# Patient Record
Sex: FEMALE | Race: BLACK OR AFRICAN AMERICAN | Employment: UNEMPLOYED | URBAN - METROPOLITAN AREA
[De-identification: names, ages, dates, MRNs, and addresses within clinical notes are randomized per-mention and may not be internally consistent; named-entity substitution may affect disease eponyms.]

---

## 2017-08-07 ENCOUNTER — HOSPITAL ENCOUNTER (EMERGENCY)
Age: 32
Discharge: HOME | End: 2017-08-07
Attending: EMERGENCY MEDICINE
Payer: SELF-PAY

## 2017-08-07 VITALS
DIASTOLIC BLOOD PRESSURE: 95 MMHG | OXYGEN SATURATION: 100 % | HEIGHT: 65 IN | WEIGHT: 216 LBS | SYSTOLIC BLOOD PRESSURE: 142 MMHG | HEART RATE: 83 BPM | RESPIRATION RATE: 20 BRPM | TEMPERATURE: 98.4 F | BODY MASS INDEX: 35.99 KG/M2

## 2017-08-07 DIAGNOSIS — K02.9 DENTAL CARIES: Primary | ICD-10-CM

## 2017-08-07 DIAGNOSIS — S02.5XXB OPEN FRACTURE OF TOOTH, INITIAL ENCOUNTER: ICD-10-CM

## 2017-08-07 PROCEDURE — 99283 EMERGENCY DEPT VISIT LOW MDM: CPT

## 2017-08-07 PROCEDURE — 74011000250 HC RX REV CODE- 250: Performed by: EMERGENCY MEDICINE

## 2017-08-07 PROCEDURE — 74011250637 HC RX REV CODE- 250/637: Performed by: EMERGENCY MEDICINE

## 2017-08-07 RX ORDER — IBUPROFEN 400 MG/1
800 TABLET ORAL
Status: COMPLETED | OUTPATIENT
Start: 2017-08-07 | End: 2017-08-07

## 2017-08-07 RX ORDER — PENICILLIN V POTASSIUM 500 MG/1
500 TABLET, FILM COATED ORAL 4 TIMES DAILY
Qty: 28 TAB | Refills: 0 | Status: SHIPPED | OUTPATIENT
Start: 2017-08-07 | End: 2017-08-14

## 2017-08-07 RX ORDER — IBUPROFEN 800 MG/1
800 TABLET ORAL
Qty: 30 TAB | Refills: 0 | Status: SHIPPED | OUTPATIENT
Start: 2017-08-07

## 2017-08-07 RX ADMIN — LIDOCAINE HYDROCHLORIDE: 20 SOLUTION ORAL; TOPICAL at 09:52

## 2017-08-07 RX ADMIN — IBUPROFEN 800 MG: 400 TABLET, FILM COATED ORAL at 09:52

## 2017-08-07 NOTE — DISCHARGE INSTRUCTIONS
Tooth Decay: Care Instructions  Your Care Instructions    Tooth decay is damage to a tooth caused by plaque. Plaque is a thin film of bacteria that sticks to the teeth above and below the gum line. If plaque isn't removed from the teeth, it can build up and harden into tartar. The bacteria in plaque and tartar use sugars in food to make acids. These acids can cause tooth decay and gum disease. Any part of your tooth can decay, from the roots below the gum line to the chewing surface. Decay can affect the outer layer (enamel) or inner layer (dentin) of your teeth. The deeper the decay, the worse the damage. Untreated tooth decay will get worse and may lead to tooth loss. If you have a small hole (cavity) in your tooth, your dentist can repair it by removing the decay and filling the hole. If you have deeper decay, you may need more treatment. A very badly damaged tooth may have to be removed. Follow-up care is a key part of your treatment and safety. Be sure to make and go to all appointments, and call your dentist if you are having problems. It's also a good idea to know your test results and keep a list of the medicines you take. How can you care for yourself at home? If you have pain:  · Take an over-the-counter pain medicine, such as acetaminophen (Tylenol), ibuprofen (Advil, Motrin), or naproxen (Aleve). Be safe with medicines. Read and follow all instructions on the label. ¨ Do not take two or more pain medicines at the same time unless the doctor told you to. Many pain medicines have acetaminophen, which is Tylenol. Too much acetaminophen (Tylenol) can be harmful. · Put ice or a cold pack on your cheek over the tooth for 10 to 15 minutes at a time. Put a thin cloth between the ice and your skin. To prevent tooth decay  · Brush teeth twice a day, and floss once a day. Brushing with fluoride toothpaste and flossing may be enough to reverse early decay.   · Use a toothbrush with soft, rounded-end bristles and a head that is small enough to reach all parts of your teeth and mouth. Replace your toothbrush every 3 or 4 months. You may also use an electric toothbrush that has rotating and oscillating (back-and-forth) action. · Ask your dentist about having fluoride treatments at the dental office. · Brush your tongue to help get rid of bacteria. · Eat healthy foods that include whole grains, vegetables, and fruits. · Have your teeth cleaned by a professional at least two times a year. · Do not smoke or use smokeless tobacco. Tobacco can make tooth decay worse. When should you call for help? Call 911 anytime you think you may need emergency care. For example, call if:  · You have trouble breathing. Call your dentist now or seek immediate medical care if:  · You have new or worse symptoms of infection, such as:  ¨ Increased pain, swelling, warmth, or redness. ¨ Red streaks leading from the area. ¨ Pus draining from the area. ¨ A fever. Watch closely for changes in your health, and be sure to contact your doctor if:  · You do not get better as expected. Where can you learn more? Go to http://cecily-willem.info/. Enter F047 in the search box to learn more about \"Tooth Decay: Care Instructions. \"  Current as of: August 11, 2016  Content Version: 11.3  © 2262-3589 [employer]. Care instructions adapted under license by [employer] (which disclaims liability or warranty for this information). If you have questions about a medical condition or this instruction, always ask your healthcare professional. [place] [address] any warranty or liability for your use of this information. Broken Tooth: Care Instructions  Your Care Instructions  A tooth can be chipped, broken, or knocked out during sports, an accident, or a bad fall. Your doctor may have fixed your tooth temporarily. You also may have been given pain medicine.  If you had signs of infection, you may need to take antibiotics. You will need to see a dentist. If you have chipped a tooth, it may be jagged, which can irritate your mouth and tongue. The dentist may smooth the edges and fill in the part that chipped off. A permanent tooth that has been knocked out can be put back in (reimplanted) if it is done quickly. The dentist may need to put a crown on a broken tooth to cover the tooth and hold it together. Prompt dental treatment can often prevent infection in the tooth. Follow-up care is a key part of your treatment and safety. Be sure to make and go to all appointments, and call your doctor if you are having problems. It's also a good idea to know your test results and keep a list of the medicines you take. How can you care for yourself at home? · If your tooth pulp is exposed, you can protect it by putting temporary filling material over the broken area. You can buy temporary filling mixes in drugstores. Follow the directions on the label. · To relieve pain and swelling, put ice or a cold cloth on the tooth's gum or cheek area, or suck on a piece of ice. But if the tooth's nerve or pulp is exposed, avoid putting anything too hot or cold near the tooth until you see your dentist.  · Ask your doctor if you can take an over-the-counter pain medicine, such as acetaminophen (Tylenol), ibuprofen (Advil, Motrin), or naproxen (Aleve). Be safe with medicines. Read and follow all instructions on the label. · If your doctor prescribed antibiotics, take them as directed. Do not stop taking them just because you feel better. You need to take the full course of antibiotics. · To help healing, rinse your mouth with warm salt water right after meals. To make a saltwater solution, mix 1 teaspoon of salt in 1 cup of warm water. · Eat soft foods that are easy to chew. · Avoid foods that might sting, such as salty or spicy foods, citrus fruits, and tomatoes.   · Do not smoke or use spit tobacco. Tobacco can slow healing in your mouth. If you need help quitting, talk to your doctor about stop-smoking programs and medicines. These can increase your chances of quitting for good. · If your tooth is loose, be gentle when you brush or floss. But be sure to brush your teeth at least two times a day, and floss at least once a day. When should you call for help? Call your doctor now or seek immediate medical care if:  · You have signs of infection, such as:  ¨ Increased pain, swelling, warmth, or redness. ¨ Red streaks leading from the area. ¨ Pus draining from the area. ¨ A fever. Watch closely for changes in your health, and be sure to contact your doctor if:  · You do not get better as expected. Where can you learn more? Go to http://cecily-willem.info/. Enter W162 in the search box to learn more about \"Broken Tooth: Care Instructions. \"  Current as of: November 17, 2016  Content Version: 11.3  © 7146-6082 [employer]. Care instructions adapted under license by [employer] (which disclaims liability or warranty for this information). If you have questions about a medical condition or this instruction, always ask your healthcare professional. [place] [address] any warranty or liability for your use of this information.

## 2017-08-07 NOTE — ED PROVIDER NOTES
HPI Comments: Lindsey Suarez is a 28 y.o. female, who presents ambulatory to [facility] ED with cc of intermittent left upper dental pain for \"a couple of months\". Pt states she woke up this morning in \"excruciating pain\" that had went away but then had come back so she decided to come to the ED. She also states that her \"tooth had broken off a couple of months ago\" and that her dental pain is only when she is eating, but not when she has cold food/drinks. She also notes that she thinks she might be grinding her teeth in her sleep as she wakes up with pain occasionally. Pt states she has a dentist, but that she hasn't seen them in 3-4 years. She specifically denies any fever, chills, N/V/D, trouble swallowing, or swelling in her neck. PCP:None    Social Hx: + smoking (0.5 ppd); + EtOH (1.0 oz/week); + illicit drug use (marijuana)    There are no other complains, changes, or physical findings at this time. The history is provided by the patient. History reviewed. No pertinent past medical history. History reviewed. No pertinent surgical history. History reviewed. No pertinent family history. Social History     Social History    Marital status: SINGLE     Spouse name: N/A    Number of children: N/A    Years of education: N/A     Occupational History    Not on file. Social History Main Topics    Smoking status: Current Every Day Smoker     Packs/day: 0.50    Smokeless tobacco: Never Used    Alcohol use 1.0 oz/week     2 Cans of beer per week    Drug use: 3.00 per week     Special: Marijuana    Sexual activity: No     Other Topics Concern    Not on file     Social History Narrative         ALLERGIES: Review of patient's allergies indicates no known allergies. Review of Systems   Constitutional: Negative for chills and fever. HENT: Positive for dental problem (left upper). Negative for congestion, rhinorrhea, sneezing, sore throat and trouble swallowing.     Eyes: Negative for redness and visual disturbance. Respiratory: Negative for shortness of breath. Cardiovascular: Negative for leg swelling. Gastrointestinal: Negative for abdominal pain, diarrhea, nausea and vomiting. Genitourinary: Negative for difficulty urinating and frequency. Musculoskeletal: Negative for back pain, myalgias and neck stiffness. Skin: Negative for rash. Neurological: Negative for dizziness, syncope, weakness and headaches. Hematological: Negative for adenopathy. Patient Vitals for the past 12 hrs:   Temp Pulse Resp BP SpO2   08/07/17 0900 98.4 °F (36.9 °C) 83 20 (!) 142/95 100 %       Physical Exam   Constitutional: She is oriented to person, place, and time. She appears well-developed and well-nourished. HENT:   Head: Normocephalic and atraumatic. Mouth/Throat: Oropharynx is clear and moist. Dental caries (multiple throughout) present. Fracture in tooth #16. Eyes: Conjunctivae and EOM are normal.   Neck: Normal range of motion and full passive range of motion without pain. Neck supple. Cardiovascular: Normal rate, regular rhythm, S1 normal, S2 normal, normal heart sounds, intact distal pulses and normal pulses. No murmur heard. Pulmonary/Chest: Effort normal and breath sounds normal. No respiratory distress. She has no wheezes. Abdominal: Soft. Normal appearance and bowel sounds are normal. She exhibits no distension. There is no tenderness. There is no rebound. Musculoskeletal: Normal range of motion. Neurological: She is alert and oriented to person, place, and time. She has normal strength. Skin: Skin is warm, dry and intact. No rash noted. Psychiatric: She has a normal mood and affect. Her speech is normal and behavior is normal. Judgment and thought content normal.   Nursing note and vitals reviewed.        MDM  Number of Diagnoses or Management Options  Dental caries:   Open fracture of tooth, initial encounter:   Diagnosis management comments: DDx: dental caries, tooth fracture; lower concern for dental abscess. Amount and/or Complexity of Data Reviewed  Review and summarize past medical records: yes    Patient Progress  Patient progress: stable    ED Course       Procedures    PROGRESS NOTE:  9:26 AM  Discussed the risks of smoking and the benefits of smoking cessation as well as the long term sequelae of smoking with the pt. The patient verbalized their understanding. Informed pt that she needs to see a dentist. Will give resource list.    MEDICATIONS GIVEN:  Medications   dental ball (lidocaine/Benadryl/Cetacaine) mixture (not administered)   ibuprofen (MOTRIN) tablet 800 mg (not administered)       IMPRESSION:  1. Dental caries    2. Open fracture of tooth, initial encounter        PLAN:  1. Current Discharge Medication List      START taking these medications    Details   penicillin v potassium (VEETID) 500 mg tablet Take 1 Tab by mouth four (4) times daily for 7 days. Qty: 28 Tab, Refills: 0      ibuprofen (MOTRIN) 800 mg tablet Take 1 Tab by mouth every eight (8) hours as needed for Pain. Qty: 30 Tab, Refills: 0           2. Follow-up Information     Follow up With Details Comments Contact Info    Your dentist Call in 1 day      [facility]  As needed, If symptoms worsen [place]  [phone]        Return to ED if worse     DISCHARGE NOTE  9:34 AM  The patient has been re-evaluated and is ready for discharge. Reviewed available results with patient. Counseled patient on diagnosis and care plan. Patient has expressed understanding, and all questions have been answered. Patient agrees with plan and agrees to follow up as recommended, or return to the ED if their symptoms worsen. Discharge instructions have been provided and explained to the patient, along with reasons to return to the ED.     ATTESTATION:  This note is prepared by Chucho Cerda, acting as Scribe for America Tate MD.    America Tate MD: The scribe's documentation has been prepared under my direction and personally reviewed by me in its entirety. I confirm that the note above accurately reflects all work, treatment, procedures, and medical decision making performed by me.

## 2017-08-07 NOTE — ED NOTES
Discharge instructions were given to the patient by Governor Corey RN. Patient was given 2 prescriptions and was encouraged to call or return to the ED for worsening issues or problems and was encouraged to schedule a follow up appointment for continuing care. Patient given a current medication reconciliation form and verbalized understanding of their medications and importance of discussing medications at follow-up. The patient verbalized understanding of discharge instructions and prescriptions, all questions were answered. The patient has no further concerns at this time. Patient stable at time of discharge. The patient left the Emergency Department ambulatory, with self, and in no acute distress. Patient declined wheelchair transport out of Emergency Department.

## 2017-08-07 NOTE — ED NOTES
Emergency Department Nursing Plan of Care       The Nursing Plan of Care is developed from the Nursing assessment and Emergency Department Attending provider initial evaluation. The plan of care may be reviewed in the ED Provider note.     The Plan of Care was developed with the following considerations:   Patient / Family readiness to learn indicated by:verbalized understanding  Persons(s) to be included in education: patient  Barriers to Learning/Limitations:No    Signed     Hui Ovalles RN    8/7/2017   9:04 AM

## 2022-04-25 ENCOUNTER — HOSPITAL ENCOUNTER (EMERGENCY)
Age: 37
Discharge: HOME | End: 2022-04-25
Attending: EMERGENCY MEDICINE

## 2022-04-25 ENCOUNTER — APPOINTMENT (OUTPATIENT)
Dept: GENERAL RADIOLOGY | Age: 37
End: 2022-04-25
Attending: EMERGENCY MEDICINE

## 2022-04-25 VITALS
SYSTOLIC BLOOD PRESSURE: 182 MMHG | HEART RATE: 88 BPM | BODY MASS INDEX: 39.99 KG/M2 | OXYGEN SATURATION: 98 % | TEMPERATURE: 98.2 F | HEIGHT: 65 IN | DIASTOLIC BLOOD PRESSURE: 97 MMHG | WEIGHT: 240 LBS | RESPIRATION RATE: 16 BRPM

## 2022-04-25 DIAGNOSIS — R06.00 DYSPNEA, UNSPECIFIED TYPE: Primary | ICD-10-CM

## 2022-04-25 DIAGNOSIS — F41.1 ANXIETY REACTION: ICD-10-CM

## 2022-04-25 DIAGNOSIS — K21.9 GASTROESOPHAGEAL REFLUX DISEASE WITHOUT ESOPHAGITIS: ICD-10-CM

## 2022-04-25 DIAGNOSIS — R07.89 ATYPICAL CHEST PAIN: ICD-10-CM

## 2022-04-25 LAB
ALBUMIN SERPL-MCNC: 3.9 G/DL (ref 3.5–5)
ALBUMIN/GLOB SERPL: 0.9 {RATIO} (ref 1.1–2.2)
ALP SERPL-CCNC: 89 U/L (ref 45–117)
ALT SERPL-CCNC: 145 U/L (ref 12–78)
ANION GAP SERPL CALC-SCNC: 13 MMOL/L (ref 5–15)
AST SERPL-CCNC: 118 U/L (ref 15–37)
BASOPHILS # BLD: 0.1 K/UL (ref 0–0.1)
BASOPHILS NFR BLD: 1 % (ref 0–1)
BILIRUB SERPL-MCNC: 0.3 MG/DL (ref 0.2–1)
BUN SERPL-MCNC: 4 MG/DL (ref 6–20)
BUN/CREAT SERPL: 7 (ref 12–20)
CALCIUM SERPL-MCNC: 8.8 MG/DL (ref 8.5–10.1)
CHLORIDE SERPL-SCNC: 97 MMOL/L (ref 97–108)
CO2 SERPL-SCNC: 22 MMOL/L (ref 21–32)
COMMENT, HOLDF: NORMAL
CREAT SERPL-MCNC: 0.59 MG/DL (ref 0.55–1.02)
D DIMER PPP FEU-MCNC: 0.47 MG/L FEU (ref 0–0.65)
DIFFERENTIAL METHOD BLD: ABNORMAL
EOSINOPHIL # BLD: 0.1 K/UL (ref 0–0.4)
EOSINOPHIL NFR BLD: 4 % (ref 0–7)
ERYTHROCYTE [DISTWIDTH] IN BLOOD BY AUTOMATED COUNT: 13.1 % (ref 11.5–14.5)
GLOBULIN SER CALC-MCNC: 4.2 G/DL (ref 2–4)
GLUCOSE SERPL-MCNC: 93 MG/DL (ref 65–100)
HCT VFR BLD AUTO: 42.3 % (ref 35–47)
HGB BLD-MCNC: 14.3 G/DL (ref 11.5–16)
IMM GRANULOCYTES # BLD AUTO: 0 K/UL (ref 0–0.04)
IMM GRANULOCYTES NFR BLD AUTO: 1 % (ref 0–0.5)
LYMPHOCYTES # BLD: 1 K/UL (ref 0.8–3.5)
LYMPHOCYTES NFR BLD: 27 % (ref 12–49)
MCH RBC QN AUTO: 28.6 PG (ref 26–34)
MCHC RBC AUTO-ENTMCNC: 33.8 G/DL (ref 30–36.5)
MCV RBC AUTO: 84.6 FL (ref 80–99)
MONOCYTES # BLD: 0.5 K/UL (ref 0–1)
MONOCYTES NFR BLD: 15 % (ref 5–13)
NEUTS SEG # BLD: 2 K/UL (ref 1.8–8)
NEUTS SEG NFR BLD: 52 % (ref 32–75)
NRBC # BLD: 0 K/UL (ref 0–0.01)
NRBC BLD-RTO: 0 PER 100 WBC
PLATELET # BLD AUTO: 316 K/UL (ref 150–400)
PMV BLD AUTO: 9 FL (ref 8.9–12.9)
POTASSIUM SERPL-SCNC: 3.9 MMOL/L (ref 3.5–5.1)
PROT SERPL-MCNC: 8.1 G/DL (ref 6.4–8.2)
RBC # BLD AUTO: 5 M/UL (ref 3.8–5.2)
SAMPLES BEING HELD,HOLD: NORMAL
SODIUM SERPL-SCNC: 132 MMOL/L (ref 136–145)
TROPONIN-HIGH SENSITIVITY: 8 NG/L (ref 0–51)
TROPONIN-HIGH SENSITIVITY: 9 NG/L (ref 0–51)
WBC # BLD AUTO: 3.7 K/UL (ref 3.6–11)

## 2022-04-25 PROCEDURE — 71045 X-RAY EXAM CHEST 1 VIEW: CPT

## 2022-04-25 PROCEDURE — 93005 ELECTROCARDIOGRAM TRACING: CPT

## 2022-04-25 PROCEDURE — 84484 ASSAY OF TROPONIN QUANT: CPT

## 2022-04-25 PROCEDURE — 36415 COLL VENOUS BLD VENIPUNCTURE: CPT

## 2022-04-25 PROCEDURE — 85025 COMPLETE CBC W/AUTO DIFF WBC: CPT

## 2022-04-25 PROCEDURE — 99285 EMERGENCY DEPT VISIT HI MDM: CPT

## 2022-04-25 PROCEDURE — 85379 FIBRIN DEGRADATION QUANT: CPT

## 2022-04-25 PROCEDURE — 80053 COMPREHEN METABOLIC PANEL: CPT

## 2022-04-25 RX ORDER — FAMOTIDINE 20 MG/1
20 TABLET, FILM COATED ORAL 2 TIMES DAILY
Qty: 60 TABLET | Refills: 0 | Status: SHIPPED | OUTPATIENT
Start: 2022-04-25 | End: 2022-05-25

## 2022-04-25 NOTE — ED NOTES
Pt presents ambulatory to ED with C/O chest pain, SOB, and anxiety. 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 Pt states she has always struggled with anxiety on and off but recently it is the worst it has ever been. Pt states she has had SOB before but never this bad and described it as an elephant sitting on her chest. Pt tearful and very talkative during assessment, O2 saturations within normal limits and pt able to speak in complete sentences with ease. Denies any PMH. Daily drinker and smoker. A/O x 4, RR even and unlabored, skin is warm and dry. Emergency Department Nursing Plan of Care       The Nursing Plan of Care is developed from the Nursing assessment and Emergency Department Attending provider initial evaluation. The plan of care may be reviewed in the ED Provider note.     The Plan of Care was developed with the following considerations:   Patient / Family readiness to learn indicated by:verbalized understanding  Persons(s) to be included in education: patient  Barriers to Learning/Limitations:No    Signed     Sophia Jimenez RN    4/25/2022   7:59 PM

## 2022-04-25 NOTE — ED TRIAGE NOTES
Pt reports having non radiating mid sternal chest pain x 1 week. Pt reports \"it feels like an elephant is sitting on my chest\". Pt also complaining of SOB x weeks. Pt is a everyday smoker. Pt denies a hx of asthma or COPD. Pt also reports a hx anxiety and reports increased stress in her life.

## 2022-04-25 NOTE — ED PROVIDER NOTES
EMERGENCY DEPARTMENT HISTORY AND PHYSICAL EXAM      Date: 4/25/2022  Patient Name: Lawyer Strickland  Patient Age and Sex: 40 y.o. female     History of Presenting Illness     Chief Complaint   Patient presents with    Chest Pain     History Provided By: Patient    HPI: Lawyer Strickland is a 40year old history anxiety disorder, tobacco abuse presents with chest pain and dyspnea. She reports the last 2 weeks she has had dyspnea. Dyspnea is intermittent, more prominent when she is at rest and resolves with exertion. She also reports intermittent chest tightness and heaviness. Worse when she lies flat. She does report a recent cough upon waking and sour taste in her mouth, has been taking Tums without much relief. She denies any lightheadedness syncope near syncope. She does report significant increase stress at home and history of severe anxiety in the past.  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 No personal or family history of VTE no leg swelling no hemoptysis. No recent surgery. There are no other complaints, changes, or physical findings at this time. PCP: None    No current facility-administered medications on file prior to encounter. Current Outpatient Medications on File Prior to Encounter   Medication Sig Dispense Refill    ibuprofen (MOTRIN) 800 mg tablet Take 1 Tab by mouth every eight (8) hours as needed for Pain. (Patient not taking: Reported on 4/25/2022) 30 Tab 0       Past History     Past Medical History:  History reviewed. No pertinent past medical history. Anxiety disorder, tobacco abuse  No history hypertension number hyperlipidemia, diabetes, VTE    Past Surgical History:  History reviewed. No pertinent surgical history. Family History:  History reviewed. No pertinent family history.     No family history of VTE    Social History:  Social History     Tobacco Use    Smoking status: Current Every Day Smoker     Packs/day: 0.50    Smokeless tobacco: Never Used   Substance Use Topics    Alcohol use: Yes     Alcohol/week: 1.7 standard drinks     Types: 2 Cans of beer per week     Comment: daily    Drug use: Yes     Frequency: 3.0 times per week     Types: Marijuana       Allergies:  No Known Allergies      Review of Systems   Review of Systems   Constitutional: Negative for chills and fever. HENT: Negative for congestion and rhinorrhea. Respiratory: Positive for shortness of breath. Cardiovascular: Positive for chest pain. Gastrointestinal: Negative for abdominal pain, nausea and vomiting. Genitourinary: Negative for dysuria and frequency. Musculoskeletal: Negative for myalgias. Psychiatric/Behavioral: The patient is nervous/anxious. All other systems reviewed and are negative. Physical Exam   Physical Exam  Vitals and nursing note reviewed. Constitutional:       General: She is not in acute distress. Appearance: Normal appearance. She is not ill-appearing. HENT:      Head: Normocephalic. Mouth/Throat:      Mouth: Mucous membranes are moist.   Eyes:      Conjunctiva/sclera: Conjunctivae normal.   Cardiovascular:      Rate and Rhythm: Regular rhythm. Tachycardia present. Pulses: Normal pulses. Heart sounds: No murmur heard. Pulmonary:      Effort: Pulmonary effort is normal. No tachypnea, accessory muscle usage or respiratory distress. Breath sounds: Normal breath sounds. Abdominal:      General: Abdomen is flat. Palpations: Abdomen is soft. Musculoskeletal:         General: No deformity. Skin:     General: Skin is warm and dry. Neurological:      Mental Status: She is alert and oriented to person, place, and time. Mental status is at baseline. Psychiatric:         Behavior: Behavior normal.         Thought Content:  Thought content normal.        Diagnostic Study Results     Labs -     Recent Results (from the past 12 hour(s))   EKG, 12 LEAD, INITIAL    Collection Time: 04/25/22  5:23 PM   Result Value Ref Range    Ventricular Rate 90 BPM    Atrial Rate 90 BPM    P-R Interval 130 ms    QRS Duration 86 ms    Q-T Interval 352 ms    QTC Calculation (Bezet) 430 ms    Calculated P Axis 63 degrees    Calculated R Axis 83 degrees    Calculated T Axis 31 degrees    Diagnosis       Normal sinus rhythm  Low voltage QRS  Borderline ECG  When compared with ECG of 18-MAR-2011 12:05,  No significant change was found     SAMPLES BEING HELD    Collection Time: 04/25/22  5:31 PM   Result Value Ref Range    SAMPLES BEING HELD 1RED 1BLUE     COMMENT        Add-on orders for these samples will be processed based on acceptable specimen integrity and analyte stability, which may vary by analyte. CBC WITH AUTOMATED DIFF    Collection Time: 04/25/22  5:31 PM   Result Value Ref Range    WBC 3.7 3.6 - 11.0 K/uL    RBC 5.00 3.80 - 5.20 M/uL    HGB 14.3 11.5 - 16.0 g/dL    HCT 42.3 35.0 - 47.0 %    MCV 84.6 80.0 - 99.0 FL    MCH 28.6 26.0 - 34.0 PG    MCHC 33.8 30.0 - 36.5 g/dL    RDW 13.1 11.5 - 14.5 %    PLATELET 762 535 - 747 K/uL    MPV 9.0 8.9 - 12.9 FL    NRBC 0.0 0  WBC    ABSOLUTE NRBC 0.00 0.00 - 0.01 K/uL    NEUTROPHILS 52 32 - 75 %    LYMPHOCYTES 27 12 - 49 %    MONOCYTES 15 (H) 5 - 13 %    EOSINOPHILS 4 0 - 7 %    BASOPHILS 1 0 - 1 %    IMMATURE GRANULOCYTES 1 (H) 0.0 - 0.5 %    ABS. NEUTROPHILS 2.0 1.8 - 8.0 K/UL    ABS. LYMPHOCYTES 1.0 0.8 - 3.5 K/UL    ABS. MONOCYTES 0.5 0.0 - 1.0 K/UL    ABS. EOSINOPHILS 0.1 0.0 - 0.4 K/UL    ABS. BASOPHILS 0.1 0.0 - 0.1 K/UL    ABS. IMM.  GRANS. 0.0 0.00 - 0.04 K/UL    DF AUTOMATED     METABOLIC PANEL, COMPREHENSIVE    Collection Time: 04/25/22  5:31 PM   Result Value Ref Range    Sodium 132 (L) 136 - 145 mmol/L    Potassium 3.9 3.5 - 5.1 mmol/L    Chloride 97 97 - 108 mmol/L    CO2 22 21 - 32 mmol/L    Anion gap 13 5 - 15 mmol/L    Glucose 93 65 - 100 mg/dL    BUN 4 (L) 6 - 20 MG/DL    Creatinine 0.59 0.55 - 1.02 MG/DL    BUN/Creatinine ratio 7 (L) 12 - 20      GFR est AA >60 >60 ml/min/1.73m2    GFR est non-AA >60 >60 ml/min/1.73m2    Calcium 8.8 8.5 - 10.1 MG/DL    Bilirubin, total 0.3 0.2 - 1.0 MG/DL    ALT (SGPT) 145 (H) 12 - 78 U/L    AST (SGOT) 118 (H) 15 - 37 U/L    Alk. phosphatase 89 45 - 117 U/L    Protein, total 8.1 6.4 - 8.2 g/dL    Albumin 3.9 3.5 - 5.0 g/dL    Globulin 4.2 (H) 2.0 - 4.0 g/dL    A-G Ratio 0.9 (L) 1.1 - 2.2     TROPONIN-HIGH SENSITIVITY    Collection Time: 04/25/22  5:31 PM   Result Value Ref Range    Troponin-High Sensitivity 8 0 - 51 ng/L   D DIMER    Collection Time: 04/25/22  5:31 PM   Result Value Ref Range    D-dimer 0.47 0.00 - 0.65 mg/L FEU   TROPONIN-HIGH SENSITIVITY    Collection Time: 04/25/22  8:22 PM   Result Value Ref Range    Troponin-High Sensitivity 9 0 - 51 ng/L     Radiologic Studies  XR CHEST PORT   Final Result   No acute process. CT Results  (Last 48 hours)    None        CXR Results  (Last 48 hours)               04/25/22 1953  XR CHEST PORT Final result    Impression:  No acute process. Narrative: Indication: Dyspnea       Comparison: 4/3/2015       Portable exam of the chest obtained at [address] [place] [address] demonstrates normal heart size. There is no acute process in the lung fields. The osseous structures are   unremarkable. Medical Decision Making   I am the first provider for this patient. I reviewed the vital signs, available nursing notes, past medical history, past surgical history, family history and social history. Vital Signs-Reviewed the patient's vital signs.   Patient Vitals for the past 12 hrs:   Temp Pulse Resp BP SpO2   04/25/22 2008  88  (!) 182/97 98 %   04/25/22 1712 98.2 °F (36.8 °C) (!) 116 16 (!) 186/95 97 %       Records Reviewed: Nursing Notes and Old Medical Records    Provider Notes (Medical Decision Making):   Differential includes anxiety, reflux, PE    Patient with tobacco abuse history, obesity otherwise no risk factors for PE, however given her reported dyspnea and tachycardia cannot apply PERC. Will obtain D-dimer given that she is low risk and I suspect her dyspnea is related to anxiety. We will do an EKG troponin to evaluate for other pathologies. CBC CMP. ED Course:   Initial assessment performed. The patients presenting problems have been discussed, and they are in agreement with the care plan formulated and outlined with them. I have encouraged them to ask questions as they arise throughout their visit. ED Course as of 04/26/22 0001 Mon Apr 25, 2022 1953 Troponin 8, CBC normal counts relatively normal differential predominant monocytes, CMP normal electrolytes normal renal function LFTs are mildly elevated, patient reports a history of hepatitis C [WB]   1953 Unlikely contributory [WB]   2016 D-dimer is negative ruling out PE heart rate is spontaneously improved to 88, delta troponin is pending [WB]   2054 Delta troponin stable at 9, will discharge [WB]      ED Course User Index  [WB] Wanda Cat MD      Disposition:  Discharge Note:  The patient has been re-evaluated and is ready for discharge. Reviewed available results with patient. Counseled patient on diagnosis and care plan. Patient has expressed understanding, and all questions have been answered. Patient agrees with plan and agrees to follow up as recommended, or to return to the ED if their symptoms worsen. Discharge instructions have been provided and explained to the patient, along with reasons to return to the ED. PLAN:  Discharge Medication List as of 4/25/2022  8:56 PM      START taking these medications    Details   famotidine (PEPCID) 20 mg tablet Take 1 Tablet by mouth two (2) times a day for 30 days. , Normal, Disp-60 Tablet, R-0         CONTINUE these medications which have NOT CHANGED    Details   ibuprofen (MOTRIN) 800 mg tablet Take 1 Tab by mouth every eight (8) hours as needed for Pain., Normal, Disp-30 Tab, R-0           2.    Follow-up Information None       3. Return to ED if worse     Diagnosis     Clinical Impression:   1. Dyspnea, unspecified type    2. Anxiety reaction    3. Atypical chest pain    4. Gastroesophageal reflux disease without esophagitis        Attestations:    Chiara Evangelista M.D. Please note that this dictation was completed with Display[employer], the computer voice recognition software. Quite often unanticipated grammatical, syntax, homophones, and other interpretive errors are inadvertently transcribed by the computer software. Please disregard these errors. Please excuse any errors that have escaped final proofreading. Thank you.

## 2022-04-26 NOTE — ED NOTES
Discharge instructions were given to the patient by Dionicoi Decker RN. The patient left the Emergency Department ambulatory, alert and oriented and in no acute distress with 1 prescription. The patient was encouraged to call or return to the ED for worsening issues or problems and was encouraged to schedule a follow up appointment for continuing care. The patient verbalized understanding of discharge instructions and prescriptions, all questions were answered. The patient has no further concerns at this time.

## 2022-04-27 LAB
ATRIAL RATE: 90 BPM
CALCULATED P AXIS, ECG09: 63 DEGREES
CALCULATED R AXIS, ECG10: 83 DEGREES
CALCULATED T AXIS, ECG11: 31 DEGREES
DIAGNOSIS, 93000: NORMAL
P-R INTERVAL, ECG05: 130 MS
Q-T INTERVAL, ECG07: 352 MS
QRS DURATION, ECG06: 86 MS
QTC CALCULATION (BEZET), ECG08: 430 MS
VENTRICULAR RATE, ECG03: 90 BPM

## 2024-06-17 ENCOUNTER — HOSPITAL ENCOUNTER (EMERGENCY)
Facility: HOSPITAL | Age: 39
Discharge: HOME | End: 2024-06-17

## 2024-06-17 VITALS
TEMPERATURE: 97.8 F | OXYGEN SATURATION: 98 % | HEIGHT: 66 IN | DIASTOLIC BLOOD PRESSURE: 98 MMHG | WEIGHT: 270 LBS | BODY MASS INDEX: 43.39 KG/M2 | SYSTOLIC BLOOD PRESSURE: 199 MMHG | RESPIRATION RATE: 18 BRPM | HEART RATE: 97 BPM

## 2024-06-17 DIAGNOSIS — B30.9 ACUTE VIRAL CONJUNCTIVITIS OF BOTH EYES: Primary | ICD-10-CM

## 2024-06-17 PROCEDURE — 99283 EMERGENCY DEPT VISIT LOW MDM: CPT

## 2024-06-17 RX ORDER — ERYTHROMYCIN 5 MG/G
OINTMENT OPHTHALMIC
Qty: 1 G | Refills: 0 | Status: SHIPPED | OUTPATIENT
Start: 2024-06-17

## 2024-06-17 ASSESSMENT — ENCOUNTER SYMPTOMS
COUGH: 0
VOMITING: 0
SHORTNESS OF BREATH: 0
SORE THROAT: 0
PHOTOPHOBIA: 0
BACK PAIN: 0
EYE PAIN: 0
DIARRHEA: 0
NAUSEA: 0
ABDOMINAL PAIN: 0
EYE REDNESS: 0
EYE DISCHARGE: 1
EYE ITCHING: 1
WHEEZING: 0
CHEST TIGHTNESS: 0
RHINORRHEA: 0

## 2024-06-17 ASSESSMENT — VISUAL ACUITY: OU: 1

## 2024-06-17 ASSESSMENT — PAIN - FUNCTIONAL ASSESSMENT: PAIN_FUNCTIONAL_ASSESSMENT: NONE - DENIES PAIN

## 2024-06-17 NOTE — ED NOTES
Discharge instructions were given to the patient by Landry Cardona RN  .  The patient left the Emergency Department alert and oriented and in no acute distress with 1 prescription(s). The patient was encouraged to call or return to the ED for worsening issues or problems and was encouraged to schedule a follow up appointment for continuing care.  The patient verbalized understanding of discharge instructions and prescriptions; all questions were answered. The patient has no further concerns at this time.

## 2024-06-17 NOTE — ED TRIAGE NOTES
Last ov 3/4/20   Pt arrives ambulatory with cc of pink eye, states she has been having discharge out of mainly her right eye.  States when she wakes up it is crusted over.

## 2024-06-17 NOTE — ED PROVIDER NOTES
diagnosis, treatment, and follow up plan. The patient agrees to follow up as outlined in discharge paperwork and also to return to the ED with any worsening. Anastasiya Gerardo PA-C         PATIENT REFERRED TO:  [facility] EMERGENCY DEPT  [address]  [facility] [address]  [phone]  Go to   As needed, If symptoms worsen    [facility]  [address]   [address]  [facility] [address]  [phone]  Schedule an appointment as soon as possible for a visit in 1 day  As needed       DISCHARGE MEDICATIONS:     Medication List        START taking these medications      erythromycin 5 MG/GM ophthalmic ointment  Commonly known as: ROMYCIN  Instill ~1 cm ribbon into affected eye(s) 4 times daily for 7 days            ASK your doctor about these medications      ibuprofen 800 MG tablet  Commonly known as: ADVIL;MOTRIN               Where to Get Your Medications        These medications were sent to [facility] [address] [place] [address] [address] [facility] [phone] - F [phone] [address] [facility] [address]      Phone: [phone]   erythromycin 5 MG/GM ophthalmic ointment           DISCONTINUED MEDICATIONS:  Current Discharge Medication List          I have seen and evaluated the patient autonomously. My supervision physician was on site and available for consultation if needed.     I am the Primary Clinician of Record.   Anastasiya Gerardo PA-C (electronically signed)    (Please note that parts of this dictation were completed with voice recognition software. Quite often unanticipated grammatical, syntax, homophones, and other interpretive errors are inadvertently transcribed by the computer software. Please disregards these errors. Please excuse any errors that have escaped final proofreading.)         Anastasiya Gerardo PA-C  06/17/24 [id number]

## 2024-06-17 NOTE — ED NOTES
Pt presents to ED complaining of right eye drainage and irritation x 2 days. Pt states that she wakes up with crusting in her eye and white and yellow drainage. Pt is alert and oriented x 4, RR even and unlabored, skin is warm and dry. Pt appears in NAD at this time. Assessment completed and pt updated on plan of care.  Call bell in reach.   Emergency Department Nursing Plan of Care  The Nursing Plan of Care is developed from the Nursing assessment and Emergency Department Attending provider initial evaluation.  The plan of care may be reviewed in the “ED Provider note”.  The Plan of Care was developed with the following considerations:  Patient / Family readiness to learn indicated by:Refer to Medical chart in [facility]  Persons(s) to be included in education: Refer to Medical chart in [facility]  Barriers to Learning/Limitations:Normal

## 2024-11-07 ENCOUNTER — HOSPITAL ENCOUNTER (EMERGENCY)
Facility: HOSPITAL | Age: 39
Discharge: HOME | End: 2024-11-07

## 2024-11-07 VITALS
HEIGHT: 66 IN | DIASTOLIC BLOOD PRESSURE: 86 MMHG | SYSTOLIC BLOOD PRESSURE: 169 MMHG | TEMPERATURE: 97.7 F | RESPIRATION RATE: 16 BRPM | HEART RATE: 96 BPM | OXYGEN SATURATION: 100 % | BODY MASS INDEX: 42.51 KG/M2 | WEIGHT: 264.5 LBS

## 2024-11-07 DIAGNOSIS — R03.0 ELEVATED BLOOD PRESSURE READING: ICD-10-CM

## 2024-11-07 DIAGNOSIS — Z86.59 HISTORY OF ANXIETY: ICD-10-CM

## 2024-11-07 DIAGNOSIS — Z87.898 HISTORY OF ALCOHOL USE DISORDER: Primary | ICD-10-CM

## 2024-11-07 PROCEDURE — 99282 EMERGENCY DEPT VISIT SF MDM: CPT

## 2024-11-07 ASSESSMENT — PAIN - FUNCTIONAL ASSESSMENT: PAIN_FUNCTIONAL_ASSESSMENT: NONE - DENIES PAIN

## 2024-11-07 NOTE — ED PROVIDER NOTES
[facility] EMERGENCY DEPT  EMERGENCY DEPARTMENT ENCOUNTER       Pt Name: Bhavik Pinto  MRN: [id number]  Birthdate 1985  Date of evaluation: 11/7/2024  Provider: SUKUMAR Max   PCP: No primary care provider on file.  Note Started: 5:43 PM EST 11/7/24     CHIEF COMPLAINT       Chief Complaint   Patient presents with    Headache    Alcohol Problem    Anxiety        HISTORY OF PRESENT ILLNESS: 1 or more elements      History From: Patient  None     Bhavik Pinto is a 39 y.o. female who presents to the ED for evaluation of \"brain fog\" just overall not feeling well after drinking yesterday all day.  Patient states she does have a problem with alcohol and frequently drinks, but states she is a very functioning alcoholic and the days that she goes to work she does not drink for 8 hours or so.  States she was off yesterday and drink throughout the day and into the evening.  States she woke up this morning and is overall did not feel well and became more anxious about this, states she has a known already history of anxiety and wanted to be evaluated.  Despite triage note she denies any headache.  Denies any changes in vision, double vision, blurry vision, vision loss.  States it is \"hard to describe\" but symptoms are more along the line of a \"brain fog\" and described as \"almost like when you did not really get much sleep the night before\".  Patient endorses anxiety but denies SI/HI.  Denies history of withdrawal seizures.  Denies difficulty speaking or swallowing, extremity weakness, numbness, tingling, difficulty ambulating or changes in gait.  Denies abdominal pain, nausea, vomiting, diarrhea, chest pain, shortness of breath, changes in bowel or bladder habits.  Patient states she just wanted to come today to get checked out and have resources provided to her for her alcohol use.     Nursing Notes were all reviewed and agreed with or any disagreements were addressed in the HPI.     REVIEW OF SYSTEMS      Review of  169/86    Pulse: (!) 110 96   Resp: 16    Temp: 97.7 °F (36.5 °C)    TempSrc: Oral    SpO2: 100%    Weight: 120 kg (264 lb 8 oz)    Height: 1.676 m (5' 6\")         Patient was given the following medications:  Medications - No data to display    CONSULTS: (Who and What was discussed)  None    Chronic Conditions: as noted above     Social Determinants affecting Dx or Tx: Patient has a substance abuse problem.     Records Reviewed (source and summary of external records): Nursing Notes and Old Medical Records    MDM (CC/HPI Summary, DDx, ED Course, Reassessment, Disposition Considerations -Tests not done, Shared Decision Making, Pt Expectation of Test or Tx.):        Patient is a 38 yo female who presents to the ED for evaluation of \"brain fog\" just overall not feeling well after drinking yesterday all day.  Patient states she does have a problem with alcohol and frequently drinks, but states she is a very functioning alcoholic and the days that she goes to work she does not drink for 8 hours or so.  States she was off yesterday and drink throughout the day and into the evening.  States she woke up this morning and is overall did not feel well and became more anxious about this, states she has a known already history of anxiety and wanted to be evaluated.  Despite triage note she denies any headache.  Denies any changes in vision, double vision, blurry vision, vision loss.  States it is \"hard to describe\" but symptoms are more along the line of a \"brain fog\" and described as \"almost like when you did not really get much sleep the night before\".  Patient endorses anxiety but denies SI/HI.  Denies history of withdrawal seizures.  Denies difficulty speaking or swallowing, extremity weakness, numbness, tingling, difficulty ambulating or changes in gait.  Denies abdominal pain, nausea, vomiting, diarrhea, chest pain, shortness of breath, changes in bowel or bladder habits.  Patient states she just wanted to come today to

## 2024-11-07 NOTE — DISCHARGE INSTRUCTIONS
Thank You!    It was a pleasure taking care of you in our Emergency Department today. We know that when you come to [facility], you are entrusting us with your health, comfort, and safety. Our clinicians honor that trust, and truly appreciate the opportunity to care for you and your loved ones.    If you receive a survey about your Emergency Department experience today, please fill it out.  We value your feedback. Thank you.      Shivani Hahn PA-C    ___________________________________  I have included a copy of your lab results and/or radiologic studies from today's visit so you can have them easily available at your follow-up visit.   No results found for this or any previous visit (from the past 12 hour(s)).    No orders to display     [unfilled]

## 2024-11-07 NOTE — ED NOTES
Pt presents to ED complaining of brain fog after drinking last night. Pt reports her last drink was approx. 0200. Pt states she drank 3-4 40 ox beers and states she is a functioning alcoholic and would like resources. Pt appears very anxious during RN assessment. Pt is alert and oriented x 4, RR even and unlabored, skin is warm and dry. Pt appears in NAD at this time. Assessment completed and pt updated on plan of care.  Call bell in reach.    Emergency Department Nursing Plan of Care  The Nursing Plan of Care is developed from the Nursing assessment and Emergency Department Attending provider initial evaluation.  The plan of care may be reviewed in the “ED Provider note”.      The Plan of Care was developed with the following considerations:  Patient / Family readiness to learn indicated by:Refer to Medical chart in [facility]  Persons(s) to be included in education: Refer to Medical chart in [facility]  Barriers to Learning/Limitations:Normal     Signed    Swapna Viramontes RN  11/7/2024

## 2024-11-07 NOTE — ED NOTES
Discharge instructions were given to the patient by SHAE Barcenas.     The patient left the Emergency Department alert and oriented and in no acute distress with  prescriptions. The patient was encouraged to call or return to the ED for worsening issues or problems and was encouraged to schedule a follow up appointment for continuing care.     Ambulation assessment completed before discharge.  Pt left Emergency Department ambulating at baseline with no ortho devices  Ortho device education: none    The patient verbalized understanding of discharge instructions and prescriptions, all questions were answered. The patient has no further concerns at this time.

## 2024-11-07 NOTE — ED TRIAGE NOTES
Pt presents ambulatory to ED complaining of brain fog after night of drinking. Pt reports she drank 3-4 40oz beers. Pt endorses that she is a functioning alcoholic and would like resources. Pt very anxious in triage. NIHSS 0 in triage.

## 2024-11-07 NOTE — ED NOTES
Pt was offered case management consult but stated she just wanted paper resources due to increased anxiety levels with being in a hospital. Pt sent home with resources about alcohol dependency.